# Patient Record
Sex: MALE | Race: WHITE | NOT HISPANIC OR LATINO | Employment: FULL TIME | URBAN - METROPOLITAN AREA
[De-identification: names, ages, dates, MRNs, and addresses within clinical notes are randomized per-mention and may not be internally consistent; named-entity substitution may affect disease eponyms.]

---

## 2022-11-26 ENCOUNTER — HOSPITAL ENCOUNTER (EMERGENCY)
Facility: CLINIC | Age: 37
Discharge: HOME | End: 2022-11-26
Attending: EMERGENCY MEDICINE | Admitting: EMERGENCY MEDICINE
Payer: COMMERCIAL

## 2022-11-26 VITALS
TEMPERATURE: 97.7 F | HEART RATE: 90 BPM | WEIGHT: 185 LBS | OXYGEN SATURATION: 96 % | HEIGHT: 67 IN | DIASTOLIC BLOOD PRESSURE: 84 MMHG | RESPIRATION RATE: 24 BRPM | SYSTOLIC BLOOD PRESSURE: 152 MMHG | BODY MASS INDEX: 29.03 KG/M2

## 2022-11-26 DIAGNOSIS — T78.40XA ALLERGIC REACTION, INITIAL ENCOUNTER: ICD-10-CM

## 2022-11-26 PROCEDURE — 250N000012 HC RX MED GY IP 250 OP 636 PS 637: Performed by: EMERGENCY MEDICINE

## 2022-11-26 PROCEDURE — 94640 AIRWAY INHALATION TREATMENT: CPT

## 2022-11-26 PROCEDURE — 250N000009 HC RX 250: Performed by: EMERGENCY MEDICINE

## 2022-11-26 PROCEDURE — 99284 EMERGENCY DEPT VISIT MOD MDM: CPT | Mod: 25

## 2022-11-26 PROCEDURE — 250N000013 HC RX MED GY IP 250 OP 250 PS 637: Performed by: EMERGENCY MEDICINE

## 2022-11-26 RX ORDER — IPRATROPIUM BROMIDE AND ALBUTEROL SULFATE 2.5; .5 MG/3ML; MG/3ML
3 SOLUTION RESPIRATORY (INHALATION) ONCE
Status: COMPLETED | OUTPATIENT
Start: 2022-11-26 | End: 2022-11-26

## 2022-11-26 RX ORDER — PREDNISONE 20 MG/1
20 TABLET ORAL DAILY
Qty: 3 TABLET | Refills: 0 | Status: SHIPPED | OUTPATIENT
Start: 2022-11-26 | End: 2022-11-29

## 2022-11-26 RX ORDER — FAMOTIDINE 20 MG/1
20 TABLET, FILM COATED ORAL ONCE
Status: COMPLETED | OUTPATIENT
Start: 2022-11-26 | End: 2022-11-26

## 2022-11-26 RX ORDER — PREDNISONE 20 MG/1
40 TABLET ORAL ONCE
Status: COMPLETED | OUTPATIENT
Start: 2022-11-26 | End: 2022-11-26

## 2022-11-26 RX ORDER — EPINEPHRINE 0.3 MG/.3ML
0.3 INJECTION SUBCUTANEOUS
Qty: 2 EACH | Refills: 3 | Status: SHIPPED | OUTPATIENT
Start: 2022-11-26 | End: 2022-11-26

## 2022-11-26 RX ORDER — EPINEPHRINE 0.3 MG/.3ML
0.3 INJECTION SUBCUTANEOUS
Qty: 2 EACH | Refills: 3 | Status: SHIPPED | OUTPATIENT
Start: 2022-11-26

## 2022-11-26 RX ORDER — DIPHENHYDRAMINE HCL 25 MG
25 CAPSULE ORAL ONCE
Status: COMPLETED | OUTPATIENT
Start: 2022-11-26 | End: 2022-11-26

## 2022-11-26 RX ADMIN — PREDNISONE 40 MG: 20 TABLET ORAL at 12:31

## 2022-11-26 RX ADMIN — DIPHENHYDRAMINE HYDROCHLORIDE 25 MG: 25 CAPSULE ORAL at 12:31

## 2022-11-26 RX ADMIN — IPRATROPIUM BROMIDE AND ALBUTEROL SULFATE 3 ML: .5; 3 SOLUTION RESPIRATORY (INHALATION) at 12:05

## 2022-11-26 RX ADMIN — FAMOTIDINE 20 MG: 20 TABLET ORAL at 12:31

## 2022-11-26 ASSESSMENT — ENCOUNTER SYMPTOMS
TROUBLE SWALLOWING: 0
FACIAL SWELLING: 0
SHORTNESS OF BREATH: 1
CHEST TIGHTNESS: 1

## 2022-11-26 NOTE — ED TRIAGE NOTES
Pt from out of town for hockey game, had pancakes at the hotel and 1.5 hours after felt onset of reddened face, itching and SOB.  Pt reports hx asthma that's well controlled.  Took 1 tab benadryl prior to arrival. No rash/hives/facial swelling seen in triage.      Triage Assessment     Row Name 11/26/22 2580       Triage Assessment (Adult)    Airway WDL X  wheeze on auscultation, c/o SOB, throat tightness       Respiratory WDL    Respiratory WDL X       Skin Circulation/Temperature WDL    Skin Circulation/Temperature WDL WDL       Cardiac WDL    Cardiac WDL WDL       Peripheral/Neurovascular WDL    Peripheral Neurovascular WDL WDL       Cognitive/Neuro/Behavioral WDL    Cognitive/Neuro/Behavioral WDL WDL

## 2022-11-26 NOTE — ED PROVIDER NOTES
"  History   Chief Complaint:  Allergic Reaction     The history is provided by the patient.      Edson Carmichael is a 37 year old male with history of non-insulin dependent diabetes mellitus and asthma who presents with \"tingling\" to his tongue and throat, whole body pruritis, and chest tightness with shortness of breath, now all improved since sudden onset earlier this morning. The patient is visiting from [place] for a hockey game and was at [employer] earlier this morning for breakfast when he developed new tingling to his tongue and back of throat while eating buttermilk pancakes and an omelette (eggs, red and green peppers, onions, and steak; no shrimp or nuts). He noted no oral swelling or tongue enlargement, so went back to his hotel where his symptoms persisted without exacerbation. However as he left for shopping at [employer], the patient reports feeling suddenly flushed, warm, and \"itchy\" across his arm, neck, and head. He additionally developed new chest tightness with dyspnea, and with worsening symptoms grew concerned and was driven to ED today by his wife. En route, he received 25 mg Benadryl. His symptoms are now generally improved following nebulization treatment provided in triage, with only mild persisting shortness of breath. He states he hasn't noticed any hives and denies other complaints. The patient has history of asthma well-controlled with an inhaler along with type 2 diabetes mellitus. He is otherwise healthy and denies recent illnesses. No known allergies to food or other.    Review of Systems   Constitutional:        (+) whole body pruritis   HENT: Negative for facial swelling and trouble swallowing.         (+) tingling to tongue/throat   Respiratory: Positive for chest tightness and shortness of breath.    Skin:        (+) \"redness\" to face   All other systems reviewed and are negative.     Allergies:  No known drug allergies    Medications:  Albuterol  Semaglutide    Past Medical " "History:     Type 2 diabetes mellitus  ADHD  Mild intermittent asthma    Past Surgical History:    Left knee arthroscopy    Family History:    Asthma  Diabetes mellitus  Kidney disease  Asthma    Social History:  The patient presents to the ED with his wife.  The patient arrived in a private vehicle.  PCP: No primary care provider on file.     Physical Exam     Patient Vitals for the past 24 hrs:   BP Temp Temp src Pulse Resp SpO2 Height Weight   11/26/22 1207 -- -- -- -- -- -- 1.702 m (5' 7\") 83.9 kg (185 lb)   11/26/22 1206 (!) 152/84 97.7  F (36.5  C) Temporal 90 24 96 % -- --     Physical Exam  GENERAL: well developed, pleasant  HEAD: atraumatic  EYES: pupils reactive, extraocular muscles intact, conjunctivae normal  ENT:  mucus membranes moist  NECK:  trachea midline, normal range of motion  RESPIRATORY: no tachypnea, breath sounds clear to auscultation   CVS: normal S1/S2, no murmurs, intact distal pulses  ABDOMEN: soft, nontender, nondistention  MUSCULOSKELETAL: no deformities  SKIN: warm and dry, no acute rashes or ulceration  NEURO: GCS 15, cranial nerves intact, alert and oriented x3  PSYCH:  Mood/affect normal    Emergency Department Course     Emergency Department Course:      Reviewed:  I reviewed nursing notes, vitals, past medical history and Care Everywhere.    Assessments:  1217 I obtained history and examined the patient as noted above.   1346 I rechecked the patient and believe that they are safe for discharge at this time..     Interventions:  1205 DuoNeb 3 mL Nebulization  1231 Benadryl 25 mg PO  1231 Prednisone 40 mg PO  1231 Pepcid 20 mg PO    Disposition:  The patient was discharged to home.     Impression & Plan     Medical Decision Making:    Patient presents with feeling of numbness to the back of his tongue [facility] then later on felt some itching throughout with no noticeable hives.  Exam here looks unremarkable.  Oral pharyngeal exam is " normal as well.  Pulmonary exam is normal.  Patient given allergic reaction medications and feeling normal.  We will have him continue allergic treatment at home with EpiPen as needed for anaphylaxis and have him follow-up with his primary back in [place].  Given unusual story although certainly seems to fit most consistent with allergic reaction.    Diagnosis:    ICD-10-CM    1. Allergic reaction, initial encounter  T78.40XA         Discharge Medications:  New Prescriptions    EPINEPHRINE (ANY BX GENERIC EQUIV) 0.3 MG/0.3ML INJECTION 2-PACK    Inject 0.3 mLs (0.3 mg) into the muscle once as needed for anaphylaxis    PREDNISONE (DELTASONE) 20 MG TABLET    Take 1 tablet (20 mg) by mouth daily for 3 days     Scribe Disclosure:  I, Suzanne Wilson, am serving as a scribe at 12:13 PM on 11/26/2022 to document services personally performed by Abelardo Reilly MD based on my observations and the provider's statements to me.     Abelardo Reilly MD  11/26/22 2904